# Patient Record
Sex: FEMALE | Race: WHITE | NOT HISPANIC OR LATINO | URBAN - METROPOLITAN AREA
[De-identification: names, ages, dates, MRNs, and addresses within clinical notes are randomized per-mention and may not be internally consistent; named-entity substitution may affect disease eponyms.]

---

## 2019-05-03 PROBLEM — 313436004 TYPE II DIABETES MELLITUS WITHOUT COMPLICATION: Status: ACTIVE | Noted: 2019-05-03

## 2019-05-03 PROBLEM — 73430006 SLEEP APNEA: Status: ACTIVE | Noted: 2019-05-03

## 2019-05-03 PROBLEM — 14304000 THYROID DISORDER: Status: ACTIVE | Noted: 2019-05-03

## 2019-05-03 PROBLEM — 161701005 HISTORY OF RESPIRATOR DEPENDENCE: Status: ACTIVE | Noted: 2019-05-03

## 2019-05-03 PROBLEM — 42343007 CONGESTIVE HEART FAILURE: Status: ACTIVE | Noted: 2019-05-03

## 2019-05-03 PROBLEM — 38341003 HYPERTENSION: Status: ACTIVE | Noted: 2019-05-03

## 2019-05-03 PROBLEM — 13644009 HYPERCHOLESTEROLEMIA: Status: ACTIVE | Noted: 2019-05-03

## 2019-08-27 PROBLEM — 13645005 CHRONIC OBSTRUCTIVE PULMONARY DISEASE: Status: ACTIVE | Noted: 2019-08-27

## 2019-08-27 PROBLEM — 90708001 RENAL DISEASE: Status: ACTIVE | Noted: 2019-08-27

## 2019-08-27 PROBLEM — 195967001 ASTHMA: Status: ACTIVE | Noted: 2019-08-27

## 2020-08-11 ENCOUNTER — OFFICE VISIT (OUTPATIENT)
Dept: URBAN - METROPOLITAN AREA CLINIC 46 | Facility: CLINIC | Age: 60
End: 2020-08-11

## 2021-08-28 ENCOUNTER — TELEPHONE ENCOUNTER (OUTPATIENT)
Dept: URBAN - METROPOLITAN AREA CLINIC 13 | Facility: CLINIC | Age: 61
End: 2021-08-28

## 2021-08-28 RX ORDER — NITROGLYCERIN 0.4 MG/1
TABLET SUBLINGUAL
OUTPATIENT
End: 2019-08-27

## 2021-08-28 RX ORDER — BUPROPION HYDROCHLORIDE 300 MG/1
TABLET, EXTENDED RELEASE ORAL
OUTPATIENT
End: 2019-08-27

## 2021-08-28 RX ORDER — INSULIN LISPRO 200 [IU]/ML
INJECTION, SOLUTION SUBCUTANEOUS
OUTPATIENT
End: 2020-08-11

## 2021-08-29 ENCOUNTER — TELEPHONE ENCOUNTER (OUTPATIENT)
Dept: URBAN - METROPOLITAN AREA CLINIC 13 | Facility: CLINIC | Age: 61
End: 2021-08-29

## 2021-08-29 RX ORDER — FLUTICASONE PROPIONATE AND SALMETEROL 50; 100 UG/1; UG/1
POWDER RESPIRATORY (INHALATION)
Status: ACTIVE | COMMUNITY

## 2021-08-29 RX ORDER — CYCLOBENZAPRINE HYDROCHLORIDE 10 MG/1
TABLET, FILM COATED ORAL
Status: ACTIVE | COMMUNITY

## 2021-08-29 RX ORDER — GABAPENTIN 800 MG/1
TABLET, FILM COATED ORAL
Status: ACTIVE | COMMUNITY

## 2021-08-29 RX ORDER — TRAZODONE HYDROCHLORIDE 300 MG/1
TABLET ORAL
Status: ACTIVE | COMMUNITY

## 2021-08-29 RX ORDER — DICYCLOMINE HYDROCHLORIDE 10 MG/1
CAPSULE ORAL
Status: ACTIVE | COMMUNITY

## 2021-08-29 RX ORDER — LISINOPRIL 2.5 MG/1
TABLET ORAL
Status: ACTIVE | COMMUNITY

## 2021-08-29 RX ORDER — CITALOPRAM 40 MG/1
TABLET ORAL
Status: ACTIVE | COMMUNITY

## 2021-08-29 RX ORDER — FENOFIBRATE 145 MG/1
TABLET, FILM COATED ORAL
Status: ACTIVE | COMMUNITY

## 2021-08-29 RX ORDER — FUROSEMIDE 20 MG/1
TABLET ORAL
Status: ACTIVE | COMMUNITY

## 2021-08-29 RX ORDER — CETIRIZINE HYDROCHLORIDE 10 MG/1
TABLET, FILM COATED ORAL
Status: ACTIVE | COMMUNITY

## 2021-08-29 RX ORDER — OXYCODONE HYDROCHLORIDE 15 MG/1
TABLET ORAL
Status: ACTIVE | COMMUNITY

## 2021-08-29 RX ORDER — LEVOTHYROXINE SODIUM 75 UG/1
TABLET ORAL
Status: ACTIVE | COMMUNITY

## 2021-08-29 RX ORDER — METFORMIN HCL 1000 MG/1
TABLET ORAL
Status: ACTIVE | COMMUNITY

## 2021-08-29 RX ORDER — ROSUVASTATIN CALCIUM 10 MG/1
TABLET, FILM COATED ORAL
Status: ACTIVE | COMMUNITY

## 2021-08-29 RX ORDER — ASPIRIN 325 MG/1
TABLET ORAL
Status: ACTIVE | COMMUNITY

## 2021-08-29 RX ORDER — CARVEDILOL 25 MG/1
TABLET, FILM COATED ORAL
Status: ACTIVE | COMMUNITY

## 2021-08-29 RX ORDER — FERROUS SULFATE 324(65)MG
TABLET, DELAYED RELEASE (ENTERIC COATED) ORAL
Status: ACTIVE | COMMUNITY

## 2021-09-20 ENCOUNTER — ERX REFILL RESPONSE (OUTPATIENT)
Dept: URBAN - METROPOLITAN AREA CLINIC 44 | Facility: CLINIC | Age: 61
End: 2021-09-20

## 2021-09-20 RX ORDER — PANCRELIPASE LIPASE, PANCRELIPASE PROTEASE, PANCRELIPASE AMYLASE 10000; 32000; 42000 [USP'U]/1; [USP'U]/1; [USP'U]/1
TAKE 2 CAPSULES BY MOUTH WITH EACH MEAL AND 1 WITH  EACH  SNACK CAPSULE, DELAYED RELEASE ORAL
Qty: 240 CAPSULE | Refills: 1 | OUTPATIENT

## 2022-01-07 ENCOUNTER — ERX REFILL RESPONSE (OUTPATIENT)
Dept: URBAN - METROPOLITAN AREA CLINIC 44 | Facility: CLINIC | Age: 62
End: 2022-01-07

## 2022-01-07 RX ORDER — PANCRELIPASE LIPASE, PANCRELIPASE PROTEASE, PANCRELIPASE AMYLASE 10000; 32000; 42000 [USP'U]/1; [USP'U]/1; [USP'U]/1
TAKE 2 CAPSULES BY MOUTH WITH EACH MEAL AND 1 WITH EACH SNACK CAPSULE, DELAYED RELEASE ORAL
Qty: 240 CAPSULE | Refills: 1 | OUTPATIENT

## 2022-01-07 RX ORDER — PANCRELIPASE LIPASE, PANCRELIPASE PROTEASE, PANCRELIPASE AMYLASE 10000; 32000; 42000 [USP'U]/1; [USP'U]/1; [USP'U]/1
TAKE 2 CAPSULES BY MOUTH WITH EACH MEAL AND 1 WITH  EACH  SNACK CAPSULE, DELAYED RELEASE ORAL
Qty: 240 CAPSULE | Refills: 1 | OUTPATIENT

## 2022-06-17 ENCOUNTER — TELEPHONE ENCOUNTER (OUTPATIENT)
Dept: URBAN - METROPOLITAN AREA CLINIC 46 | Facility: CLINIC | Age: 62
End: 2022-06-17

## 2022-06-17 RX ORDER — PANCRELIPASE LIPASE, PANCRELIPASE PROTEASE, PANCRELIPASE AMYLASE 10000; 32000; 42000 [USP'U]/1; [USP'U]/1; [USP'U]/1
TAKE 2 CAPSULES BY MOUTH WITH EACH MEAL AND 1 WITH EACH SNACK CAPSULE, DELAYED RELEASE ORAL
Qty: 240 CAPSULE | Refills: 1

## 2022-08-04 ENCOUNTER — DASHBOARD ENCOUNTERS (OUTPATIENT)
Age: 62
End: 2022-08-04

## 2022-08-05 ENCOUNTER — LAB OUTSIDE AN ENCOUNTER (OUTPATIENT)
Dept: URBAN - METROPOLITAN AREA CLINIC 46 | Facility: CLINIC | Age: 62
End: 2022-08-05

## 2022-08-05 ENCOUNTER — OFFICE VISIT (OUTPATIENT)
Dept: URBAN - METROPOLITAN AREA CLINIC 46 | Facility: CLINIC | Age: 62
End: 2022-08-05
Payer: MEDICARE

## 2022-08-05 VITALS
HEIGHT: 62 IN | TEMPERATURE: 98 F | HEART RATE: 64 BPM | WEIGHT: 282.8 LBS | DIASTOLIC BLOOD PRESSURE: 69 MMHG | BODY MASS INDEX: 52.04 KG/M2 | SYSTOLIC BLOOD PRESSURE: 142 MMHG

## 2022-08-05 DIAGNOSIS — K59.09 CHRONIC CONSTIPATION: ICD-10-CM

## 2022-08-05 DIAGNOSIS — K57.50 DIVERTICULOSIS OF BOTH SMALL AND LARGE INTESTINE WITHOUT PERFORATION OR ABSCESS: ICD-10-CM

## 2022-08-05 DIAGNOSIS — Z86.010 PERSONAL HISTORY OF COLONIC POLYPS: ICD-10-CM

## 2022-08-05 PROBLEM — 398050005 DIVERTICULAR DISEASE OF COLON: Status: ACTIVE | Noted: 2019-05-03

## 2022-08-05 PROCEDURE — 99213 OFFICE O/P EST LOW 20 MIN: CPT | Performed by: INTERNAL MEDICINE

## 2022-08-05 RX ORDER — FUROSEMIDE 20 MG/1
TABLET ORAL
Status: ACTIVE | COMMUNITY

## 2022-08-05 RX ORDER — TRAZODONE HYDROCHLORIDE 300 MG/1
TABLET ORAL
Status: ACTIVE | COMMUNITY

## 2022-08-05 RX ORDER — CETIRIZINE HYDROCHLORIDE 10 MG/1
TABLET, FILM COATED ORAL
Status: ACTIVE | COMMUNITY

## 2022-08-05 RX ORDER — METOCLOPRAMIDE HYDROCHLORIDE 10 MG/1
1 TABLET BEFORE MEALS TABLET ORAL TWICE A DAY
Status: ACTIVE | COMMUNITY

## 2022-08-05 RX ORDER — ROSUVASTATIN CALCIUM 10 MG/1
TABLET, FILM COATED ORAL
Status: ACTIVE | COMMUNITY

## 2022-08-05 RX ORDER — GABAPENTIN 800 MG/1
TABLET, FILM COATED ORAL
Status: ACTIVE | COMMUNITY

## 2022-08-05 RX ORDER — METFORMIN HCL 1000 MG/1
TABLET ORAL
Status: ACTIVE | COMMUNITY

## 2022-08-05 RX ORDER — FENOFIBRATE 145 MG/1
TABLET, FILM COATED ORAL
Status: ACTIVE | COMMUNITY

## 2022-08-05 RX ORDER — BUPROPION HYDROCHLORIDE 300 MG/1
1 TABLET IN THE MORNING TABLET, EXTENDED RELEASE ORAL ONCE A DAY
Status: ACTIVE | COMMUNITY

## 2022-08-05 RX ORDER — ASPIRIN 81 MG/1
1 TABLET TABLET, CHEWABLE ORAL
Status: ACTIVE | COMMUNITY

## 2022-08-05 RX ORDER — OXYCODONE HYDROCHLORIDE 30 MG/1
AS DIRECTED TABLET ORAL
Status: ACTIVE | COMMUNITY

## 2022-08-05 RX ORDER — FERROUS FUMARATE/ASCORBIC ACID 65MG-25 MG
1 TABLET TABLET, EXTENDED RELEASE ORAL ONCE A DAY
Status: ACTIVE | COMMUNITY

## 2022-08-05 RX ORDER — LISINOPRIL 2.5 MG/1
TABLET ORAL
Status: ACTIVE | COMMUNITY

## 2022-08-05 RX ORDER — PANCRELIPASE LIPASE, PANCRELIPASE PROTEASE, PANCRELIPASE AMYLASE 10000; 32000; 42000 [USP'U]/1; [USP'U]/1; [USP'U]/1
TAKE 2 CAPSULES BY MOUTH WITH EACH MEAL AND 1 WITH EACH SNACK CAPSULE, DELAYED RELEASE ORAL
Qty: 240 CAPSULE | Refills: 1
End: 2022-10-04

## 2022-08-05 RX ORDER — CITALOPRAM 40 MG/1
TABLET ORAL
Status: ACTIVE | COMMUNITY

## 2022-08-05 RX ORDER — FLUTICASONE PROPIONATE AND SALMETEROL 50; 100 UG/1; UG/1
POWDER RESPIRATORY (INHALATION)
Status: ACTIVE | COMMUNITY

## 2022-08-05 RX ORDER — LEVOTHYROXINE SODIUM 200 UG/1
1 TABLET IN THE MORNING ON AN EMPTY STOMACH TABLET ORAL ONCE A DAY
Status: ACTIVE | COMMUNITY

## 2022-08-05 RX ORDER — PANCRELIPASE LIPASE, PANCRELIPASE PROTEASE, PANCRELIPASE AMYLASE 10000; 32000; 42000 [USP'U]/1; [USP'U]/1; [USP'U]/1
TAKE 2 CAPSULES BY MOUTH WITH EACH MEAL AND 1 WITH EACH SNACK CAPSULE, DELAYED RELEASE ORAL
Qty: 240 CAPSULE | Refills: 1 | Status: ACTIVE | COMMUNITY

## 2022-08-05 NOTE — PHYSICAL EXAM CONSTITUTIONAL:
morbidly obese, well developed, well nourished , in no acute distress , ambulating without difficulty , normal communication ability ,

## 2022-08-05 NOTE — HPI-TODAY'S VISIT:
62 year old female with a history of adenomatous polyps. Last colon was 9/27/19 and showed pan diverticulosis with 9 adenomatous polyps. Prep was suboptimal. She is now having problems with a change in her bowel patterns. She is worried she has a blockage. She is now on Metformin,    She had Zenpep in the past. That did help with bloating and diarrhea. She is now having constipation every 3 days, She is taking MiraLAX. Stool is hard when she finally goes. CT with stone protocol showed diverticulosis, hepatomegaly, and diverticulosis. Dr Pulido has been managing her kidney stones and did lithotripsy in the past. Triglycerides were 426.  She is having abdominal pain in upper abdomen with radiation to tayler. Could be her kidney stone. Having a bowel movement helps the pressure and helps the pain.  EGD in the hospital showed gastritis.

## 2022-08-16 PROBLEM — 428283002: Status: ACTIVE | Noted: 2022-08-05

## 2022-10-19 ENCOUNTER — OFFICE VISIT (OUTPATIENT)
Dept: URBAN - METROPOLITAN AREA MEDICAL CENTER 35 | Facility: MEDICAL CENTER | Age: 62
End: 2022-10-19
Payer: MEDICARE

## 2022-10-19 DIAGNOSIS — D12.4 ADENOMA OF DESCENDING COLON: ICD-10-CM

## 2022-10-19 DIAGNOSIS — D12.2 ADENOMA OF ASCENDING COLON: ICD-10-CM

## 2022-10-19 DIAGNOSIS — Z86.010 ADENOMAS PERSONAL HISTORY OF COLONIC POLYPS: ICD-10-CM

## 2022-10-19 DIAGNOSIS — D12.5 ADENOMA OF SIGMOID COLON: ICD-10-CM

## 2022-10-19 PROCEDURE — 45380 COLONOSCOPY AND BIOPSY: CPT | Performed by: INTERNAL MEDICINE

## 2022-10-19 PROCEDURE — 45385 COLONOSCOPY W/LESION REMOVAL: CPT | Performed by: INTERNAL MEDICINE
